# Patient Record
Sex: FEMALE | Race: WHITE | NOT HISPANIC OR LATINO | URBAN - METROPOLITAN AREA
[De-identification: names, ages, dates, MRNs, and addresses within clinical notes are randomized per-mention and may not be internally consistent; named-entity substitution may affect disease eponyms.]

---

## 2025-01-07 ENCOUNTER — APPOINTMENT (OUTPATIENT)
Dept: RHEUMATOLOGY | Facility: CLINIC | Age: 50
End: 2025-01-07
Payer: COMMERCIAL

## 2025-01-07 ENCOUNTER — LAB (OUTPATIENT)
Dept: LAB | Facility: LAB | Age: 50
End: 2025-01-07
Payer: COMMERCIAL

## 2025-01-07 VITALS
HEIGHT: 65 IN | OXYGEN SATURATION: 96 % | SYSTOLIC BLOOD PRESSURE: 117 MMHG | HEART RATE: 79 BPM | BODY MASS INDEX: 35.56 KG/M2 | WEIGHT: 213.4 LBS | DIASTOLIC BLOOD PRESSURE: 81 MMHG

## 2025-01-07 DIAGNOSIS — M32.9 SYSTEMIC LUPUS ERYTHEMATOSUS, UNSPECIFIED SLE TYPE, UNSPECIFIED ORGAN INVOLVEMENT STATUS (MULTI): ICD-10-CM

## 2025-01-07 DIAGNOSIS — Z86.2 HISTORY OF ITP: ICD-10-CM

## 2025-01-07 DIAGNOSIS — M32.9 SYSTEMIC LUPUS ERYTHEMATOSUS, UNSPECIFIED SLE TYPE, UNSPECIFIED ORGAN INVOLVEMENT STATUS (MULTI): Primary | ICD-10-CM

## 2025-01-07 LAB
25(OH)D3 SERPL-MCNC: 48 NG/ML (ref 30–100)
ALBUMIN SERPL BCP-MCNC: 4.6 G/DL (ref 3.4–5)
ALP SERPL-CCNC: 93 U/L (ref 33–110)
ALT SERPL W P-5'-P-CCNC: 31 U/L (ref 7–45)
APPEARANCE UR: CLEAR
AST SERPL W P-5'-P-CCNC: 28 U/L (ref 9–39)
B2 GLYCOPROT1 IGA SER-ACNC: >65 U/ML
B2 GLYCOPROT1 IGG SER-ACNC: >112 U/ML
B2 GLYCOPROT1 IGM SER-ACNC: 51.3 U/ML
BASOPHILS # BLD AUTO: 0.04 X10*3/UL (ref 0–0.1)
BASOPHILS NFR BLD AUTO: 0.6 %
BILIRUB DIRECT SERPL-MCNC: 0.1 MG/DL (ref 0–0.3)
BILIRUB SERPL-MCNC: 0.5 MG/DL (ref 0–1.2)
BILIRUB UR STRIP.AUTO-MCNC: NEGATIVE MG/DL
C3 SERPL-MCNC: 124 MG/DL (ref 87–200)
C4 SERPL-MCNC: 10 MG/DL (ref 10–50)
CARDIOLIPIN IGA SERPL-ACNC: >65 APL U/ML
CARDIOLIPIN IGG SER IA-ACNC: >112 GPL U/ML
CARDIOLIPIN IGM SER IA-ACNC: 11.7 MPL U/ML
CCP IGG SERPL-ACNC: <1 U/ML
CENTROMERE B AB SER-ACNC: <0.2 AI
CHROMATIN AB SERPL-ACNC: 0.2 AI
COLOR UR: COLORLESS
CREAT SERPL-MCNC: 1.49 MG/DL (ref 0.5–1.05)
CREAT UR-MCNC: 44.8 MG/DL (ref 20–320)
DSDNA AB SER-ACNC: 16 IU/ML
EGFRCR SERPLBLD CKD-EPI 2021: 43 ML/MIN/1.73M*2
ENA JO1 AB SER QL IA: <0.2 AI
ENA RNP AB SER IA-ACNC: <0.2 AI
ENA SCL70 AB SER QL IA: <0.2 AI
ENA SM AB SER IA-ACNC: <0.2 AI
ENA SM+RNP AB SER QL IA: <0.2 AI
ENA SS-A AB SER IA-ACNC: <0.2 AI
ENA SS-B AB SER IA-ACNC: <0.2 AI
EOSINOPHIL # BLD AUTO: 0.24 X10*3/UL (ref 0–0.7)
EOSINOPHIL NFR BLD AUTO: 3.5 %
ERYTHROCYTE [DISTWIDTH] IN BLOOD BY AUTOMATED COUNT: 13.2 % (ref 11.5–14.5)
GLUCOSE UR STRIP.AUTO-MCNC: NORMAL MG/DL
HCT VFR BLD AUTO: 38.1 % (ref 36–46)
HGB BLD-MCNC: 12.1 G/DL (ref 12–16)
IMM GRANULOCYTES # BLD AUTO: 0.21 X10*3/UL (ref 0–0.7)
IMM GRANULOCYTES NFR BLD AUTO: 3.1 % (ref 0–0.9)
KETONES UR STRIP.AUTO-MCNC: NEGATIVE MG/DL
LEUKOCYTE ESTERASE UR QL STRIP.AUTO: NEGATIVE
LYMPHOCYTES # BLD AUTO: 1.61 X10*3/UL (ref 1.2–4.8)
LYMPHOCYTES NFR BLD AUTO: 23.6 %
MCH RBC QN AUTO: 28.5 PG (ref 26–34)
MCHC RBC AUTO-ENTMCNC: 31.8 G/DL (ref 32–36)
MCV RBC AUTO: 90 FL (ref 80–100)
MONOCYTES # BLD AUTO: 0.7 X10*3/UL (ref 0.1–1)
MONOCYTES NFR BLD AUTO: 10.3 %
NEUTROPHILS # BLD AUTO: 4.01 X10*3/UL (ref 1.2–7.7)
NEUTROPHILS NFR BLD AUTO: 58.9 %
NITRITE UR QL STRIP.AUTO: NEGATIVE
NRBC BLD-RTO: 0 /100 WBCS (ref 0–0)
PH UR STRIP.AUTO: 6 [PH]
PLATELET # BLD AUTO: 163 X10*3/UL (ref 150–450)
PROT SERPL-MCNC: 8 G/DL (ref 6.4–8.2)
PROT SERPL-MCNC: 8 G/DL (ref 6.4–8.2)
PROT UR STRIP.AUTO-MCNC: NEGATIVE MG/DL
PROT UR-ACNC: 5 MG/DL (ref 5–24)
PROT/CREAT UR: 0.11 MG/MG CREAT (ref 0–0.17)
RBC # BLD AUTO: 4.24 X10*6/UL (ref 4–5.2)
RBC # UR STRIP.AUTO: NEGATIVE /UL
RHEUMATOID FACT SER NEPH-ACNC: <10 IU/ML (ref 0–15)
RIBOSOMAL P AB SER-ACNC: <0.2 AI
SP GR UR STRIP.AUTO: 1
UROBILINOGEN UR STRIP.AUTO-MCNC: NORMAL MG/DL
WBC # BLD AUTO: 6.8 X10*3/UL (ref 4.4–11.3)

## 2025-01-07 PROCEDURE — 86334 IMMUNOFIX E-PHORESIS SERUM: CPT

## 2025-01-07 PROCEDURE — 86146 BETA-2 GLYCOPROTEIN ANTIBODY: CPT

## 2025-01-07 PROCEDURE — 80076 HEPATIC FUNCTION PANEL: CPT

## 2025-01-07 PROCEDURE — 82306 VITAMIN D 25 HYDROXY: CPT

## 2025-01-07 PROCEDURE — 81003 URINALYSIS AUTO W/O SCOPE: CPT

## 2025-01-07 PROCEDURE — 86235 NUCLEAR ANTIGEN ANTIBODY: CPT

## 2025-01-07 PROCEDURE — 84155 ASSAY OF PROTEIN SERUM: CPT

## 2025-01-07 PROCEDURE — 84165 PROTEIN E-PHORESIS SERUM: CPT

## 2025-01-07 PROCEDURE — 82570 ASSAY OF URINE CREATININE: CPT

## 2025-01-07 PROCEDURE — 84165 PROTEIN E-PHORESIS SERUM: CPT | Performed by: INTERNAL MEDICINE

## 2025-01-07 PROCEDURE — 84156 ASSAY OF PROTEIN URINE: CPT

## 2025-01-07 PROCEDURE — 86160 COMPLEMENT ANTIGEN: CPT

## 2025-01-07 PROCEDURE — 86038 ANTINUCLEAR ANTIBODIES: CPT

## 2025-01-07 PROCEDURE — 86431 RHEUMATOID FACTOR QUANT: CPT

## 2025-01-07 PROCEDURE — 86147 CARDIOLIPIN ANTIBODY EA IG: CPT

## 2025-01-07 PROCEDURE — 99204 OFFICE O/P NEW MOD 45 MIN: CPT | Performed by: INTERNAL MEDICINE

## 2025-01-07 PROCEDURE — 1036F TOBACCO NON-USER: CPT | Performed by: INTERNAL MEDICINE

## 2025-01-07 PROCEDURE — 3008F BODY MASS INDEX DOCD: CPT | Performed by: INTERNAL MEDICINE

## 2025-01-07 PROCEDURE — 86225 DNA ANTIBODY NATIVE: CPT

## 2025-01-07 PROCEDURE — 85025 COMPLETE CBC W/AUTO DIFF WBC: CPT

## 2025-01-07 PROCEDURE — 82565 ASSAY OF CREATININE: CPT

## 2025-01-07 PROCEDURE — 86320 SERUM IMMUNOELECTROPHORESIS: CPT | Performed by: INTERNAL MEDICINE

## 2025-01-07 PROCEDURE — 86200 CCP ANTIBODY: CPT

## 2025-01-07 RX ORDER — ASCORBIC ACID 500 MG
1 TABLET ORAL DAILY
COMMUNITY
Start: 2018-01-05

## 2025-01-07 RX ORDER — LISINOPRIL 20 MG/1
20 TABLET ORAL DAILY
COMMUNITY
Start: 2024-12-20

## 2025-01-07 ASSESSMENT — PATIENT HEALTH QUESTIONNAIRE - PHQ9
1. LITTLE INTEREST OR PLEASURE IN DOING THINGS: NOT AT ALL
SUM OF ALL RESPONSES TO PHQ9 QUESTIONS 1 AND 2: 0
2. FEELING DOWN, DEPRESSED OR HOPELESS: NOT AT ALL

## 2025-01-07 ASSESSMENT — ENCOUNTER SYMPTOMS
OCCASIONAL FEELINGS OF UNSTEADINESS: 0
DEPRESSION: 0
LOSS OF SENSATION IN FEET: 0

## 2025-01-07 ASSESSMENT — PAIN SCALES - GENERAL: PAINLEVEL_OUTOF10: 0-NO PAIN

## 2025-01-07 NOTE — PROGRESS NOTES
Initial Rheumatology Patient Visit    Chief Complaint:  Catalina Taylor is a 49 y.o. female presenting today for New Patient Visit.    History of Presenting Problem:   49 y.o. female with Hx of ITP, SLE present for evaluation. She recalled when she was 16 y/o when she started to have nose bleed and noted to have low platelets. She recalls having issues tolerating prednisone Treatment and was given Colchicine intermittently. She report she was diagnosed with SLE after her serologies show this concern but she never took treatment.   She denies acute concern today.    Problem List: There is no problem list on file for this patient.      Past Medical History: History reviewed. No pertinent past medical history.    Surgical History: History reviewed. No pertinent surgical history.     Allergies:   Allergies   Allergen Reactions    Codeine Rash and Swelling    Penicillins Rash       Medications:   Current Outpatient Medications:     ascorbic acid (Vitamin C) 500 mg tablet, Take 1 tablet (500 mg) by mouth once daily., Disp: , Rfl:     calcium carbonate/vitamin D3 (CALCIUM 600 + D,3, ORAL), 1 tabs, ORAL, BID, Refill(s) 0, Date: 11/19/24 4:04:00 PM EST, Disp: , Rfl:     lisinopril 20 mg tablet, Take 1 tablet (20 mg) by mouth once daily., Disp: , Rfl:     multivit-minerals/folic acid (ONE-A-DAY WOMEN'S 50 PLUS ORAL), Take 1 tablet by mouth once daily., Disp: , Rfl:     omega3/dha/epa/dpa/fish oil/D3 (OMEGA-3 2100 VITAMIN D3 ORAL), OMEGA-3 640MG, 1 CAPSULE, ORAL, DAILY, Date: 11/19/24 4:05:00 PM EST, Supply, Disp: , Rfl:     Review of Systems:   ROS: Denies Morning stiffness, joint swelling, Denies dry eyes and mouth, Denies oral, nasal or genital ulcers, Denies sun sensitivity, She report her pointer finger  will occasionally change color in the last couple weeks Denies Raynaud's phenomenon. Denies Uveitis or recent vision changes. Denies new rashes or Hx of Psoriasis, Denies alopecia,   Denies Chest pain/SOB, cough, Hx of  "asthma, Denies Fever/chills/sweats, Denies Fatigue, weight loss  Denies abdominal pain, New onset Dyspepsia, dysphasia, nausea/vomiting/diarrhea, dark/tarry stools, blood in stools or urine. Denies Chronic back pain.   Denies Hx of blood clot or miscarriages. Hx of easy bruising or bleeding. Denies Headaches, numbness and tingling, weakness.  All other systems have been reviewed and are negative for complaint.   Mother with SLE nephritis     Objective   Physical Examination:    Visit Vitals  /81   Pulse 79   Ht 1.651 m (5' 5\")   Wt 96.8 kg (213 lb 6.4 oz)   SpO2 96%   BMI 35.51 kg/m²   Smoking Status Former   BSA 2.11 m²        Gen: NAD, A&O x 3  HEENT: clear sclera and conjunctiva,     Musculoskeletal:   Neck; WNL, full ROM  Shoulder: WNL, full ROM  Elbow:WNL, full ROM, no effusion noted  Wrist and fingers;no active synovitis noted, Full ROM in the Wrist , Good fist and   Knees:  No effusions or crepitation, full ROM.  Hips; WNL, full ROM, Negative Sterling test  Ankle, Feet; WNL, full ROM    Skin: No rashes or lesions seen, no nail changes  Neuro: A&O x3, Normal Gait    Procedures :None    Orders:  Orders Placed This Encounter   Procedures    JULISA + HERRERA Panel    C3 Complement    C4 Complement    Citrulline Antibody, IgG    Rheumatoid Factor    Serum Protein Electrophoresis + Immunofixation    CBC and Auto Differential    Creatinine    Hepatic Function Panel    Urinalysis with Reflex Microscopic    Protein, Urine Random    Creatinine, Urine Random    Vitamin D 25-Hydroxy,Total (for eval of Vitamin D levels)    Anti-Cardiolipin Antibody (IgA, IgG, and IgM)    Beta-2 Glycoprotein Antibodies    Lupus Anticoag. With Interpretation[French]        Provider Impression:   Assessment/Plan   Encounter Diagnoses   Name Primary?    Systemic lupus erythematosus, unspecified SLE type, unspecified organ involvement status (Multi) Yes    History of ITP         49 y.o. female with Hx of ITP, SLE present for evaluation.  She " was diagnosed when she was 15.  She is not on any current treatment for lupus so ITP at this time.  She has been lost to care for many years due to insurance coverage.    -Continue to monitor off treatment  -Obtain additional serologies   -Recommend establishing with hematology  -Plan to follow-up after reviewing lab work today

## 2025-01-08 LAB
ANA PATTERN: ABNORMAL
ANA SER QL HEP2 SUBST: POSITIVE
ANA TITR SER IF: ABNORMAL {TITER}
CYTOPLASMIC PATTERN: PRESENT

## 2025-01-09 ENCOUNTER — LAB (OUTPATIENT)
Dept: LAB | Facility: LAB | Age: 50
End: 2025-01-09
Payer: COMMERCIAL

## 2025-01-09 DIAGNOSIS — M32.9 SYSTEMIC LUPUS ERYTHEMATOSUS, UNSPECIFIED SLE TYPE, UNSPECIFIED ORGAN INVOLVEMENT STATUS (MULTI): ICD-10-CM

## 2025-01-09 PROCEDURE — 85613 RUSSELL VIPER VENOM DILUTED: CPT

## 2025-01-09 PROCEDURE — 85730 THROMBOPLASTIN TIME PARTIAL: CPT

## 2025-01-10 LAB
ALBUMIN: 4.7 G/DL (ref 3.4–5)
ALPHA 1 GLOBULIN: 0.3 G/DL (ref 0.2–0.6)
ALPHA 2 GLOBULIN: 0.7 G/DL (ref 0.4–1.1)
BETA GLOBULIN: 1 G/DL (ref 0.5–1.2)
GAMMA GLOBULIN: 1.4 G/DL (ref 0.5–1.4)
IMMUNOFIXATION COMMENT: NORMAL
PATH REVIEW - SERUM IMMUNOFIXATION: NORMAL
PATH REVIEW-SERUM PROTEIN ELECTROPHORESIS: NORMAL
PROTEIN ELECTROPHORESIS COMMENT: NORMAL

## 2025-01-13 LAB
DRVVT SCREEN TO CONFIRM RATIO: 2.61 RATIO
DRVVT/DRVVT CFM NRMLZD PPP-RTO: 1.09 RATIO
DRVVT/DRVVT CFM P DOAC NEUT NORM PPP-RTO: 2.41 RATIO
LA 2 SCREEN W REFLEX-IMP: ABNORMAL
NORMALIZED SCT PPP-RTO: 2.63 RATIO
SILICA CLOTTING TIME CONFIRMATION: 1.1 RATIO
SILICA CLOTTING TIME SCREEN: 2.9 RATIO

## 2025-01-27 ENCOUNTER — APPOINTMENT (OUTPATIENT)
Dept: RHEUMATOLOGY | Facility: CLINIC | Age: 50
End: 2025-01-27
Payer: COMMERCIAL

## 2025-01-27 DIAGNOSIS — M32.9 SYSTEMIC LUPUS ERYTHEMATOSUS, UNSPECIFIED SLE TYPE, UNSPECIFIED ORGAN INVOLVEMENT STATUS (MULTI): ICD-10-CM

## 2025-01-27 DIAGNOSIS — R76.0 ANTIPHOSPHOLIPID ANTIBODY POSITIVE: Primary | ICD-10-CM

## 2025-01-27 PROCEDURE — 1036F TOBACCO NON-USER: CPT | Performed by: INTERNAL MEDICINE

## 2025-01-27 PROCEDURE — 99214 OFFICE O/P EST MOD 30 MIN: CPT | Performed by: INTERNAL MEDICINE

## 2025-01-27 NOTE — PROGRESS NOTES
Initial Rheumatology Patient Visit    Chief Complaint:  Catalina Taylor is a 49 y.o. female presenting today for No chief complaint on file..    History of Presenting Problem:   49 y.o. female with Hx of ITP, SLE present for evaluation. She recalled when she was 14 y/o when she started to have nose bleed and noted to have low platelets. She recalls having issues tolerating prednisone Treatment and was given Colchicine intermittently. She report she was diagnosed with SLE after her serologies show this concern but she never took treatment.   She denies acute concern today.  She is here to review her recent blood testing.  She denies any history of blood clots in the past  Mother with Hx of SLE nephritis     Problem List: There is no problem list on file for this patient.      Past Medical History: History reviewed. No pertinent past medical history.    Surgical History: History reviewed. No pertinent surgical history.     Allergies:   Allergies   Allergen Reactions    Codeine Rash and Swelling    Penicillins Rash       Medications:   Current Outpatient Medications:     ascorbic acid (Vitamin C) 500 mg tablet, Take 1 tablet (500 mg) by mouth once daily., Disp: , Rfl:     calcium carbonate/vitamin D3 (CALCIUM 600 + D,3, ORAL), 1 tabs, ORAL, BID, Refill(s) 0, Date: 11/19/24 4:04:00 PM EST, Disp: , Rfl:     lisinopril 20 mg tablet, Take 1 tablet (20 mg) by mouth once daily., Disp: , Rfl:     multivit-minerals/folic acid (ONE-A-DAY WOMEN'S 50 PLUS ORAL), Take 1 tablet by mouth once daily., Disp: , Rfl:     omega3/dha/epa/dpa/fish oil/D3 (OMEGA-3 2100 VITAMIN D3 ORAL), OMEGA-3 640MG, 1 CAPSULE, ORAL, DAILY, Date: 11/19/24 4:05:00 PM EST, Supply, Disp: , Rfl:           Objective   Physical Examination:    Visit Vitals  Smoking Status Former          Procedures :None    Orders:  Orders Placed This Encounter   Procedures    Beta-2 Glycoprotein Antibodies    Anti-Cardiolipin Antibody (IgA, IgG, and IgM)        Provider Impression:    Assessment/Plan   Encounter Diagnoses   Name Primary?    Antiphospholipid antibody positive Yes    Systemic lupus erythematosus, unspecified SLE type, unspecified organ involvement status (Multi)           49 y.o. female with Hx of ITP, SLE present for evaluation.  She was diagnosed when she was 15.  She is not on any current treatment for lupus so ITP at this time.  She has been lost to care for many years due to insurance coverage.  Today I reviewed with patient her recent blood work which shows antiphospholipid antibodies.   I recommended she reestablish treatment with hematology to discuss her findings.  Will repeat antiphospholipid antibodies 3 months from now  -Also discussed recommendation of trying hydroxychloroquine as a way to modulate her immune system even though she does not have any active signs of disease.  Patient is to consider this after contemplation and seeing the hematologist  -Follow-up in 6 months

## 2025-03-06 ENCOUNTER — HOSPITAL ENCOUNTER (OUTPATIENT)
Dept: RADIOLOGY | Facility: CLINIC | Age: 50
Discharge: HOME | End: 2025-03-06
Payer: COMMERCIAL

## 2025-03-06 VITALS — BODY MASS INDEX: 36.65 KG/M2 | HEIGHT: 65 IN | WEIGHT: 220 LBS

## 2025-03-06 DIAGNOSIS — Z12.31 SCREENING MAMMOGRAM FOR BREAST CANCER: ICD-10-CM

## 2025-03-06 PROCEDURE — 77067 SCR MAMMO BI INCL CAD: CPT

## 2025-03-18 ENCOUNTER — HOSPITAL ENCOUNTER (OUTPATIENT)
Dept: RADIOLOGY | Facility: EXTERNAL LOCATION | Age: 50
Discharge: HOME | End: 2025-03-18

## 2025-03-31 ENCOUNTER — HOSPITAL ENCOUNTER (OUTPATIENT)
Dept: RADIOLOGY | Facility: CLINIC | Age: 50
Discharge: HOME | End: 2025-03-31
Payer: COMMERCIAL

## 2025-03-31 DIAGNOSIS — R92.1 MAMMOGRAPHIC CALCIFICATION FOUND ON DIAGNOSTIC IMAGING OF BREAST: ICD-10-CM

## 2025-03-31 DIAGNOSIS — R92.8 OTHER ABNORMAL AND INCONCLUSIVE FINDINGS ON DIAGNOSTIC IMAGING OF BREAST: ICD-10-CM

## 2025-03-31 PROCEDURE — 77065 DX MAMMO INCL CAD UNI: CPT | Mod: RT

## 2025-03-31 PROCEDURE — 77065 DX MAMMO INCL CAD UNI: CPT | Mod: RIGHT SIDE | Performed by: RADIOLOGY

## 2025-04-27 DIAGNOSIS — R76.0 ANTIPHOSPHOLIPID ANTIBODY POSITIVE: ICD-10-CM
